# Patient Record
Sex: FEMALE | URBAN - METROPOLITAN AREA
[De-identification: names, ages, dates, MRNs, and addresses within clinical notes are randomized per-mention and may not be internally consistent; named-entity substitution may affect disease eponyms.]

---

## 2024-06-19 ENCOUNTER — PROCEDURE VISIT (OUTPATIENT)
Age: 54
End: 2024-06-19

## 2024-06-19 VITALS
HEIGHT: 62 IN | DIASTOLIC BLOOD PRESSURE: 75 MMHG | WEIGHT: 172 LBS | BODY MASS INDEX: 31.65 KG/M2 | HEART RATE: 72 BPM | SYSTOLIC BLOOD PRESSURE: 122 MMHG | OXYGEN SATURATION: 99 % | TEMPERATURE: 97.8 F

## 2024-06-19 DIAGNOSIS — R20.0 NUMBNESS AND TINGLING IN RIGHT HAND: Primary | ICD-10-CM

## 2024-06-19 DIAGNOSIS — R20.2 NUMBNESS AND TINGLING IN RIGHT HAND: Primary | ICD-10-CM

## 2024-06-19 DIAGNOSIS — G56.01 CARPAL TUNNEL SYNDROME OF RIGHT WRIST: ICD-10-CM

## 2024-06-19 DIAGNOSIS — R94.131 ABNORMAL EMG: ICD-10-CM

## 2024-06-19 NOTE — PROGRESS NOTES
NOTE        Chart reviewed for the following:   Leobardo CORDERO, have reviewed the History, Physical and updated the Allergic reactions for Agueda Burton     TIME OUT performed immediately prior to start of procedure:   Leobardo CORDERO, have performed the following reviews on Agueda Burton prior to the start of the procedure:            * Patient was identified by name and date of birth   * Agreement on procedure being performed was verified  * Risks and Benefits explained to the patient  * Procedure site verified and marked as necessary  * Patient was positioned for comfort  * Consent was signed and verified     Time: 4:42PM    Date of procedure: 6/19/2024    Procedure performed by:  Leobardo Harper MD    Provider accompanied by: Justin.    Patient accompanied by: Family Member.    How tolerated by patient: tolerated the procedure well with no complications    Post Procedural Pain Scale: 0 - No Hurt    Comments: none    Written by Jana Hernández, as dictated by Meredith Booth MD